# Patient Record
Sex: FEMALE | Race: BLACK OR AFRICAN AMERICAN | NOT HISPANIC OR LATINO | URBAN - METROPOLITAN AREA
[De-identification: names, ages, dates, MRNs, and addresses within clinical notes are randomized per-mention and may not be internally consistent; named-entity substitution may affect disease eponyms.]

---

## 2024-05-28 ENCOUNTER — ANCILLARY PROCEDURE (OUTPATIENT)
Dept: GENERAL RADIOLOGY | Facility: CLINIC | Age: 74
End: 2024-05-28
Attending: FAMILY MEDICINE
Payer: COMMERCIAL

## 2024-05-28 ENCOUNTER — OFFICE VISIT (OUTPATIENT)
Dept: FAMILY MEDICINE | Facility: CLINIC | Age: 74
End: 2024-05-28
Payer: COMMERCIAL

## 2024-05-28 VITALS
TEMPERATURE: 98.9 F | DIASTOLIC BLOOD PRESSURE: 82 MMHG | WEIGHT: 194.8 LBS | HEART RATE: 105 BPM | OXYGEN SATURATION: 98 % | RESPIRATION RATE: 15 BRPM | SYSTOLIC BLOOD PRESSURE: 138 MMHG

## 2024-05-28 DIAGNOSIS — G89.29 CHRONIC LEFT SHOULDER PAIN: ICD-10-CM

## 2024-05-28 DIAGNOSIS — M79.645 PAIN OF LEFT THUMB: ICD-10-CM

## 2024-05-28 DIAGNOSIS — M25.522 LEFT ELBOW PAIN: ICD-10-CM

## 2024-05-28 DIAGNOSIS — M25.512 CHRONIC LEFT SHOULDER PAIN: Primary | ICD-10-CM

## 2024-05-28 DIAGNOSIS — E11.42 TYPE 2 DIABETES MELLITUS WITH DIABETIC POLYNEUROPATHY, WITHOUT LONG-TERM CURRENT USE OF INSULIN (H): ICD-10-CM

## 2024-05-28 DIAGNOSIS — M25.512 CHRONIC LEFT SHOULDER PAIN: ICD-10-CM

## 2024-05-28 DIAGNOSIS — G89.29 CHRONIC LEFT SHOULDER PAIN: Primary | ICD-10-CM

## 2024-05-28 LAB — HBA1C MFR BLD: 7.2 % (ref 0–5.6)

## 2024-05-28 PROCEDURE — 73080 X-RAY EXAM OF ELBOW: CPT | Mod: TC | Performed by: RADIOLOGY

## 2024-05-28 PROCEDURE — 82570 ASSAY OF URINE CREATININE: CPT | Performed by: FAMILY MEDICINE

## 2024-05-28 PROCEDURE — 84460 ALANINE AMINO (ALT) (SGPT): CPT | Performed by: FAMILY MEDICINE

## 2024-05-28 PROCEDURE — 82607 VITAMIN B-12: CPT | Performed by: FAMILY MEDICINE

## 2024-05-28 PROCEDURE — 73030 X-RAY EXAM OF SHOULDER: CPT | Mod: TC | Performed by: RADIOLOGY

## 2024-05-28 PROCEDURE — 82043 UR ALBUMIN QUANTITATIVE: CPT | Performed by: FAMILY MEDICINE

## 2024-05-28 PROCEDURE — 80048 BASIC METABOLIC PNL TOTAL CA: CPT | Performed by: FAMILY MEDICINE

## 2024-05-28 PROCEDURE — 36415 COLL VENOUS BLD VENIPUNCTURE: CPT | Performed by: FAMILY MEDICINE

## 2024-05-28 PROCEDURE — 83721 ASSAY OF BLOOD LIPOPROTEIN: CPT | Mod: 59 | Performed by: FAMILY MEDICINE

## 2024-05-28 PROCEDURE — 73140 X-RAY EXAM OF FINGER(S): CPT | Mod: TC | Performed by: RADIOLOGY

## 2024-05-28 PROCEDURE — 83036 HEMOGLOBIN GLYCOSYLATED A1C: CPT | Performed by: FAMILY MEDICINE

## 2024-05-28 PROCEDURE — 99204 OFFICE O/P NEW MOD 45 MIN: CPT | Performed by: FAMILY MEDICINE

## 2024-05-28 PROCEDURE — 80061 LIPID PANEL: CPT | Performed by: FAMILY MEDICINE

## 2024-05-28 RX ORDER — GLYBURIDE-METFORMIN HYDROCHLORIDE 5; 500 MG/1; MG/1
2 TABLET ORAL 2 TIMES DAILY WITH MEALS
Qty: 120 TABLET | Refills: 4 | Status: SHIPPED | OUTPATIENT
Start: 2024-05-28

## 2024-05-28 RX ORDER — SIMVASTATIN 20 MG
20 TABLET ORAL AT BEDTIME
Qty: 30 TABLET | Refills: 11 | Status: SHIPPED | OUTPATIENT
Start: 2024-05-28 | End: 2024-05-30 | Stop reason: DRUGHIGH

## 2024-05-28 ASSESSMENT — PAIN SCALES - GENERAL: PAINLEVEL: WORST PAIN (10)

## 2024-05-28 NOTE — PROGRESS NOTES
Assessment & Plan     Maryam presented as new patient to me to evaluate her chronic left shoulder and left arm pain .  Previous records from [facility] reviewed.  Conversation done with help of .    Chronic left shoulder pain  -Maryam had a fall 9 months ago when returning from [place] to [place] and since then having chronic left shoulder pain, left elbow pain and left thumb base pain.  -No sensory deficits but daily activity is limited due to pain.  -Has not had any physical therapy.  On Tylenol as needed.    O/E:  -Restricted range of movements left shoulder/left elbow.  -Tenderness base of thumb.    PLAN:  -Possible shoulder adhesive capsulitis.  X-rays ordered.  -Recommend physical therapy/diclofenac gel for both shoulder and elbow discomfort.  -Left thumb base discomfort could be secondary to arthritis.  X-rays ordered to rule out fracture.  -Follow-up in 2 months to reassess after physical therapy.  Plan for Ortho referral if symptoms still persist and bothersome.    - XR Shoulder Left G/E 3 Views; Future  - Physical Therapy  Referral; Future  - diclofenac (VOLTAREN) 1 % topical gel; Apply 2 g topically 4 times daily as needed for moderate pain    Pain of left thumb  -Same as above.    - XR Finger Left G/E 2 Views; Future  - Occupational Therapy  Referral; Future  - diclofenac (VOLTAREN) 1 % topical gel; Apply 2 g topically 4 times daily as needed for moderate pain    Left elbow pain  -Same as above.    - Physical Therapy  Referral; Future  - XR Elbow Left G/E 3 Views; Future    Type 2 diabetes mellitus with diabetic polyneuropathy, without long-term current use of insulin (H)  -No HbA1c in records.  Patient had issues with insurance so far.  Currently under ARE and preferred labs done.  -She is done with vitamin B12 supplements.  -On Zocor 20 mg and Glucovance 5/500 (2 tablets twice daily)   -Recommended eye checkup.    - Hemoglobin A1c; Future  -  Vitamin B12; Future  - Albumin Random Urine Quantitative with Creat Ratio; Future  - Basic metabolic panel  (Ca, Cl, CO2, Creat, Gluc, K, Na, BUN); Future  - simvastatin (ZOCOR) 20 MG tablet; Take 1 tablet (20 mg) by mouth at bedtime  - glyBURIDE-metFORMIN (GLUCOVANCE) 5-500 MG tablet; Take 2 tablets by mouth 2 times daily (with meals)  - ALT; Future  - Lipid panel reflex to direct LDL Non-fasting; Future                Subjective   Maryam is a 74 year old, presenting for the following health issues:  Shoulder Pain (Left shoulder: 8 months)      5/28/2024    11:51 AM   Additional Questions   Roomed by Anali   Accompanied by self     Shoulder Pain    History of Present Illness       Reason for visit:  Left shoulder and arm pain  Symptom onset:  More than a month  Symptoms include:  Pain left side arm and shoulder  Symptom intensity:  Moderate  Symptom progression:  Worsening  Had these symptoms before:  No    She eats 2-3 servings of fruits and vegetables daily.She consumes 0 sweetened beverage(s) daily.She exercises with enough effort to increase her heart rate 9 or less minutes per day.  She exercises with enough effort to increase her heart rate 3 or less days per week.   She is taking medications regularly.     9 months ago, came from [place] to , fell down on shoulder              Review of Systems  Constitutional, HEENT, cardiovascular, pulmonary, gi and gu systems are negative, except as otherwise noted.      Objective    /82 (BP Location: Left arm, Patient Position: Sitting, Cuff Size: Adult Regular)   Pulse 105   Temp 98.9  F (37.2  C) (Oral)   Resp 15   Wt 88.4 kg (194 lb 12.8 oz)   SpO2 98%   There is no height or weight on file to calculate BMI.  Physical Exam   GENERAL: alert and no distress  NECK: no adenopathy, no asymmetry, masses, or scars  RESP: lungs clear to auscultation - no rales, rhonchi or wheezes  CV: regular rate and rhythm, normal S1 S2, no S3 or S4, no murmur, click or rub, no  peripheral edema  ABDOMEN: soft, nontender, no hepatosplenomegaly, no masses and bowel sounds normal  MS: Restricted range of movements left shoulder, left elbow pain.            Signed Electronically by: Cordell Carias MD

## 2024-05-29 LAB
ALT SERPL W P-5'-P-CCNC: 13 U/L (ref 0–50)
ANION GAP SERPL CALCULATED.3IONS-SCNC: 13 MMOL/L (ref 7–15)
BUN SERPL-MCNC: 9.3 MG/DL (ref 8–23)
CALCIUM SERPL-MCNC: 9.5 MG/DL (ref 8.8–10.2)
CHLORIDE SERPL-SCNC: 103 MMOL/L (ref 98–107)
CHOLEST SERPL-MCNC: 244 MG/DL
CREAT SERPL-MCNC: 0.57 MG/DL (ref 0.51–0.95)
CREAT UR-MCNC: 169 MG/DL
DEPRECATED HCO3 PLAS-SCNC: 23 MMOL/L (ref 22–29)
EGFRCR SERPLBLD CKD-EPI 2021: >90 ML/MIN/1.73M2
FASTING STATUS PATIENT QL REPORTED: NO
FASTING STATUS PATIENT QL REPORTED: NO
GLUCOSE SERPL-MCNC: 223 MG/DL (ref 70–99)
HDLC SERPL-MCNC: 32 MG/DL
LDLC SERPL CALC-MCNC: ABNORMAL MG/DL
LDLC SERPL DIRECT ASSAY-MCNC: 147 MG/DL
MICROALBUMIN UR-MCNC: <12 MG/L
MICROALBUMIN/CREAT UR: NORMAL MG/G{CREAT}
NONHDLC SERPL-MCNC: 212 MG/DL
POTASSIUM SERPL-SCNC: 3.9 MMOL/L (ref 3.4–5.3)
SODIUM SERPL-SCNC: 139 MMOL/L (ref 135–145)
TRIGL SERPL-MCNC: 449 MG/DL
VIT B12 SERPL-MCNC: 644 PG/ML (ref 232–1245)

## 2024-05-30 ENCOUNTER — TELEPHONE (OUTPATIENT)
Dept: FAMILY MEDICINE | Facility: CLINIC | Age: 74
End: 2024-05-30
Payer: COMMERCIAL

## 2024-05-30 ENCOUNTER — APPOINTMENT (OUTPATIENT)
Dept: INTERPRETER SERVICES | Facility: CLINIC | Age: 74
End: 2024-05-30
Payer: COMMERCIAL

## 2024-05-30 DIAGNOSIS — G89.29 CHRONIC LEFT SHOULDER PAIN: Primary | ICD-10-CM

## 2024-05-30 DIAGNOSIS — E11.42 TYPE 2 DIABETES MELLITUS WITH DIABETIC POLYNEUROPATHY, WITHOUT LONG-TERM CURRENT USE OF INSULIN (H): ICD-10-CM

## 2024-05-30 DIAGNOSIS — E78.5 HYPERLIPIDEMIA LDL GOAL <130: ICD-10-CM

## 2024-05-30 DIAGNOSIS — M25.512 CHRONIC LEFT SHOULDER PAIN: Primary | ICD-10-CM

## 2024-05-30 DIAGNOSIS — R93.89 ABNORMAL FINDING ON IMAGING: ICD-10-CM

## 2024-05-30 RX ORDER — SIMVASTATIN 40 MG
40 TABLET ORAL AT BEDTIME
Qty: 30 TABLET | Refills: 11 | Status: SHIPPED | OUTPATIENT
Start: 2024-05-30

## 2024-05-30 NOTE — TELEPHONE ENCOUNTER
This writer attempted to contact patient via  on 05/30/24      Reason for call results and left message.      If patient calls back:   Registered Nurse called.       ----- Message from Cordell Carias MD sent at 5/30/2024  1:11 PM CDT -----  Please let patient know with help of .    -Cholesterol levels are still high.  I am increasing the dose of cholesterol medication. If she is experiencing muscle aches or any side effects like chest pain/palpitations etc  with increased dose she should let me know. We need to recheck cholesterol levels in 3 months.  Orders are in.    -HbA1c slightly above goal.  Our goal should be less than 7.  Continue current medications.  Please ask her to be compliant with her medications and not miss doses.  Cut down excess carbohydrates as in rice, pasta, noodles and limit processed sugar intake.  We can recheck HbA1c in 3 months along with cholesterol labs.    Regarding x-rays:  -Thumb x-ray shows mild arthritic changes.  No fractures noted.  Start doing occupational therapy and follow-up in 2 months.    -No fractures noted in elbow x-ray.    -Shoulder x-ray: As per report, there is a possibility of old fracture or tendon changes at the attachment where patient feels discomfort.  No acute fractures noted.  I have placed orders for MRI and Ortho referral.  Patient can see how she is doing with physical therapy and after a month if she still has discomfort, she can proceed with MRI and Ortho referral.    Thank you,  -Dr.Pasu Rossana Baker, BSN, RN  [facility]

## 2024-05-31 NOTE — TELEPHONE ENCOUNTER
This writer attempted to contact patient on 05/31/24      Reason for call results and LVM via [place] .      If patient calls back:   Relay message below, (read verbatim), document that pt called and close encounter        Ashley Whitehead RN

## 2024-06-03 NOTE — TELEPHONE ENCOUNTER
This writer attempted to contact patient via  on 06/3/24 (3rd attempt)        Reason for call results and left message.        If patient calls back:              Registered Nurse called.         ----- Message from Cordell Carias MD sent at 5/30/2024  1:11 PM CDT -----  Please let patient know with help of .     -Cholesterol levels are still high.  I am increasing the dose of cholesterol medication. If she is experiencing muscle aches or any side effects like chest pain/palpitations etc  with increased dose she should let me know. We need to recheck cholesterol levels in 3 months.  Orders are in.     -HbA1c slightly above goal.  Our goal should be less than 7.  Continue current medications.  Please ask her to be compliant with her medications and not miss doses.  Cut down excess carbohydrates as in rice, pasta, noodles and limit processed sugar intake.  We can recheck HbA1c in 3 months along with cholesterol labs.     Regarding x-rays:  -Thumb x-ray shows mild arthritic changes.  No fractures noted.  Start doing occupational therapy and follow-up in 2 months.     -No fractures noted in elbow x-ray.     -Shoulder x-ray: As per report, there is a possibility of old fracture or tendon changes at the attachment where patient feels discomfort.  No acute fractures noted.  I have placed orders for MRI and Ortho referral.  Patient can see how she is doing with physical therapy and after a month if she still has discomfort, she can proceed with MRI and Ortho referral.     Thank you,  -Dr.Pasu Rossana Baker, RICHIEN, RN  [facility]

## 2024-06-04 NOTE — TELEPHONE ENCOUNTER
Unsuccessful at reaching the patient after 3 attempts by phone.  Patient is not active on Mychart.  Routing to provider to advise on next steps.      Kristina Kjellberg, MSN, RN

## 2024-07-19 ENCOUNTER — THERAPY VISIT (OUTPATIENT)
Dept: PHYSICAL THERAPY | Facility: CLINIC | Age: 74
End: 2024-07-19
Attending: FAMILY MEDICINE
Payer: COMMERCIAL

## 2024-07-19 DIAGNOSIS — M25.512 LEFT SHOULDER PAIN, UNSPECIFIED CHRONICITY: Primary | ICD-10-CM

## 2024-07-19 DIAGNOSIS — M25.522 LEFT ELBOW PAIN: ICD-10-CM

## 2024-07-19 PROCEDURE — 97161 PT EVAL LOW COMPLEX 20 MIN: CPT | Mod: GP | Performed by: PHYSICAL THERAPIST

## 2024-07-19 PROCEDURE — 97530 THERAPEUTIC ACTIVITIES: CPT | Mod: GP | Performed by: PHYSICAL THERAPIST

## 2024-07-19 ASSESSMENT — ACTIVITIES OF DAILY LIVING (ADL)
PLEASE_INDICATE_YOR_PRIMARY_REASON_FOR_REFERRAL_TO_THERAPY:: SHOULDER
THROWING_A_BALL: EXTREME DIFFICULTY
AT_ITS_WORST?: 9
OPENING_DOORS: QUITE A BIT OF DIFFICULTY
PUTTING_ON_AN_UNDERSHIRT_OR_A_PULLOVER_SWEATER: 10
REMOVING_SOMETHING_FROM_YOUR_BACK_POCKET: 10
WASHING_YOUR_HAIR?: 10
WHEN_LYING_ON_THE_INVOLVED_SIDE: 10
PUTTING_ON_YOUR_PANTS: 10
TYING_OR_LACING_SHOES: EXTREME DIFFICULTY
WASHING_YOUR_HAIR_OR_SCALP: EXTREME DIFFICULTY
USING_TOOLS_OR_APPLIANCES: EXTREME DIFFICULTY
LAUNDERING_CLOTHES: EXTREME DIFFICULTY
CARRYING_A_SMALL_SUITCASE_WITH_YOUR_AFFECTED_LIMB: EXTREME DIFFICULTY
ANY_OF_YOUR_USUAL_WORK,_HOUSEWORK_OR_SCHOOL_ACTIVITIES: QUITE A BIT OF DIFFICULTY
YOUR_USUAL_HOBBIES,_RECREATIONAL_OR_SPORTING_ACTIVITIES: QUITE A BIT OF DIFFICULTY
PLACING_AN_OBJECT_ON_A_HIGH_SHELF: 10
DRIVING: EXTREME DIFFICULTY
WASHING_YOUR_BACK: 10
PLACING_AN_OBJECT_ONTO,_OR_REMOVING_IT_FROM_AN_OVERHEAD_SHELF: QUITE A BIT OF DIFFICULTY
PUTTING_ON_A_SHIRT_THAT_BUTTONS_DOWN_THE_FRONT: 10
SLEEPING: EXTREME DIFFICULTY
CARRYING_A_HEAVY_OBJECT_OF_10_POUNDS: 10
PUSHING_UP_ON_YOUR_HANDS: EXTREME DIFFICULTY
TOUCHING_THE_BACK_OF_YOUR_NECK: 9
LIFTING_A_BAG_OF_GROCERIES_TO_WAIST_LEVEL: QUITE A BIT OF DIFFICULTY
UEFI_TOTAL_SCORE: 7
UEFI_TOTAL_SCORE/80: .09
PUSHING_WITH_THE_INVOLVED_ARM: 10
DOING_UP_BUTTONS: EXTREME DIFFICULTY
DRESS: EXTREME DIFFICULTY
PLEASE_INDICATE_YOR_PRIMARY_REASON_FOR_REFERRAL_TO_THERAPY:: ELBOW
PREPARING_FOOD: QUITE A BIT OF DIFFICULTY
REACHING_FOR_SOMETHING_ON_A_HIGH_SHELF: 10
VACUUMING,_SWEEPING,_OR_RAKING: QUITE A BIT OF DIFFICULTY
CLEANING: QUITE A BIT OF DIFFICULTY
OPENING_A_JAR: EXTREME DIFFICULTY

## 2024-07-19 NOTE — PROGRESS NOTES
PHYSICAL THERAPY EVALUATION  Type of Visit: Evaluation    See electronic medical record for Abuse and Falls Screening details.            Subjective     REFERRAL DX:   Chronic left shoulder pain   Left elbow pain       SUBJECTIVE HISTORY: Patient is a RHD 74 year old female presenting with hx of L shoulder and arm pain, here with . She had fall 11 months ago when returning from [place] to [place]. Since then has had chronic L shoulder, elbow and thumb pain. Numbness and tingling in the shoulder.    Daughter is helping her with most ADLs. Hurts to do anything with the shoulder. Got some medications from the doctor but did not seem to help. Having trouble sleeping.    PAIN:  With activity: 9/10  Frequency: with activity  Quality: sharp pain, numbness/tingling.  Progression:  no change  Exacerbated by: movement/use of arm  Eased by: medication, activity modification       Objective         Presenting condition or subjective complaint:    Date of onset: 05/30/24    Relevant medical history: Cold or hot arm or leg; Diabetes   Dates & types of surgery:      Prior diagnostic imaging/testing results: X-ray     Prior therapy history for the same diagnosis, illness or injury:        Living Environment  Social support: With family members   Type of home: House; 2-story   Stairs to enter the home:         Ramp: No   Stairs inside the home: Yes 14 Is there a railing: Yes     Help at home: Self Cares (home health aide/personal care attendant, family, etc); Home management tasks (cooking, cleaning); Medication and/or finances; Home and Yard maintenance tasks; Assist for driving and community activities; Meals on wheels/meal service; Emergency call system  Equipment owned: Straight Cane     Employment: No    Hobbies/Interests:      Patient goals for therapy:      SHOULDER OBJECTIVE  ROM:  unable to assess due to heightened pain, AROM or PROM  STRENGTH: unable to assess due to heightened pain    CERVICAL OBJECTIVE  ROM:    (Degrees) Left AROM Right AROM    Cervical Flexion 50% limited, with pain    Cervical Extension 50% limited with pain    Cervical Side bend 25% limited, pain 25% limited, pain    Cervical Rotation 50% limited, with pain 50% limited, with pain      PALPATION:   + Tenderness At Location Left   Sternocleidomastoid -   Scalenes -   Rhomboids -   Facet -   Upper Trap +   Levator +   Erector Spinae -   Suboccipitals -        + Tenderness At Location Left   Clavicle -   Sternoclavicular -   Acromioclavicular -   Biceps -   Triceps -   Supraspinatus +   Infraspinatus +   Teres minor -   Subscapularis -   Deltoid +   Levator +   Rhomboids -   Upper trap +       Assessment & Plan   CLINICAL IMPRESSIONS  Medical Diagnosis: L shoulder, elbow pain    Treatment Diagnosis: L shoulder, elbow pain   Impression/Assessment: Patient is a 74 year old female with L shoulder complaints.  The following significant findings have been identified: Pain, Decreased ROM/flexibility, Decreased strength, Impaired muscle performance, and Decreased activity tolerance. Assessment today was limited due to heightened pain levels. Discussed methods to help with pain management but any prescription of activity/exercises were not well tolerated. Discussed with daughter to follow up following MRI and possibly returning to [place] prior to continuing with therapy interventions. These impairments interfere with their ability to perform self care tasks as compared to previous level of function.     Clinical Decision Making (Complexity):  Clinical Presentation: Evolving/Changing  Clinical Presentation Rationale: based on medical and personal factors listed in PT evaluation  Clinical Decision Making (Complexity): Low complexity    PLAN OF CARE  Treatment Interventions:  Modalities: Cryotherapy, Hot Pack  Interventions: Manual Therapy, Neuromuscular Re-education, Therapeutic Activity, Therapeutic Exercise, Self-Care/Home Management    Long Term Goals     PT Goal  1  Goal Description: Patient will report 10% reduction in disability on SPADI  Rationale: to maximize safety and independence with performance of ADLs and functional tasks  Target Date: 09/13/24      Frequency of Treatment: 1x/week  Duration of Treatment: 8 weeks    Education Assessment:   Learner/Method: Patient  Education Comments: PTRx    Risks and benefits of evaluation/treatment have been explained.   Patient/Family/caregiver agrees with Plan of Care.     Evaluation Time:     PT Eval, Low Complexity Minutes (42226): 15   Present: Yes: Language: Oromo, ID Number/Identifier: [id number]     Signing Clinician: Michelle Dubois, PT        [facility]                                                                                   OUTPATIENT PHYSICAL THERAPY      PLAN OF TREATMENT FOR OUTPATIENT REHABILITATION   Patient's Last Name, First Name, Maryam West YOB: 1950   Provider's Name   [facility]   Medical Record No.  [id number]     Onset Date: 05/30/24  Start of Care Date: 07/19/24     Medical Diagnosis:  L shoulder, elbow pain      PT Treatment Diagnosis:  L shoulder, elbow pain Plan of Treatment  Frequency/Duration: 1x/week/ 8 weeks    Certification date from 07/19/24 to 09/13/24         See note for plan of treatment details and functional goals     Michelle Dubois, PT                         I CERTIFY THE NEED FOR THESE SERVICES FURNISHED UNDER        THIS PLAN OF TREATMENT AND WHILE UNDER MY CARE     (Physician attestation of this document indicates review and certification of the therapy plan).              Referring Provider:  Cordell Carias    Initial Assessment  See Epic Evaluation- Start of Care Date: 07/19/24

## 2024-07-23 ENCOUNTER — ANCILLARY PROCEDURE (OUTPATIENT)
Dept: MRI IMAGING | Facility: CLINIC | Age: 74
End: 2024-07-23
Attending: FAMILY MEDICINE
Payer: COMMERCIAL

## 2024-07-23 DIAGNOSIS — G89.29 CHRONIC LEFT SHOULDER PAIN: ICD-10-CM

## 2024-07-23 DIAGNOSIS — R93.89 ABNORMAL FINDING ON IMAGING: ICD-10-CM

## 2024-07-23 DIAGNOSIS — M25.512 CHRONIC LEFT SHOULDER PAIN: ICD-10-CM

## 2024-07-23 PROCEDURE — 73221 MRI JOINT UPR EXTREM W/O DYE: CPT | Mod: LT | Performed by: RADIOLOGY

## 2024-07-25 DIAGNOSIS — M75.52 SUBACROMIAL BURSITIS OF LEFT SHOULDER JOINT: ICD-10-CM

## 2024-07-25 DIAGNOSIS — M75.102 TEAR OF LEFT SUPRASPINATUS TENDON: Primary | ICD-10-CM

## 2024-07-25 DIAGNOSIS — R93.89 ABNORMAL FINDING ON IMAGING: ICD-10-CM

## 2024-07-29 DIAGNOSIS — M25.512 LEFT SHOULDER PAIN, UNSPECIFIED CHRONICITY: Primary | ICD-10-CM

## 2024-09-27 NOTE — TELEPHONE ENCOUNTER
REASON FOR VISIT: Tear of left supraspinatus tendon, Subacromial bursitis of left shoulder joint, Abnormal finding on imaging    DATE OF APPT: 9/30/2024   NOTES (FOR ALL VISITS) STATUS DETAILS   OFFICE NOTE from referring provider [facility] Cordell Lindsey MD 7/25/2024   OFFICE NOTE from other specialist [facility] Michelle Almodovar, PT 7/19/2024   MEDICATION LIST N/A    IMAGING  (FOR ALL VISITS)     XR Scheduled [facility]  XR Shoulder left 9/30/2024   MRI (HEAD, NECK, SPINE) [facility]  MR Shoulder left 7/23/2024   CT (HEAD, NECK, SPINE) N/A

## 2024-09-30 ENCOUNTER — PRE VISIT (OUTPATIENT)
Dept: ORTHOPEDICS | Facility: CLINIC | Age: 74
End: 2024-09-30

## 2024-10-10 ENCOUNTER — TELEPHONE (OUTPATIENT)
Dept: FAMILY MEDICINE | Facility: CLINIC | Age: 74
End: 2024-10-10
Payer: COMMERCIAL

## 2024-10-10 NOTE — TELEPHONE ENCOUNTER
Patient Quality Outreach    Patient is due for the following:   Diabetes -  A1C and Eye Exam  Colon Cancer Screening  Breast Cancer Screening - Mammogram  Physical Annual Wellness Visit      Topic Date Due    Pneumococcal Vaccine (1 of 2 - PCV) Never done    Diptheria Tetanus Pertussis (DTAP/TDAP/TD) Vaccine (1 - Tdap) Never done    Zoster (Shingles) Vaccine (1 of 2) Never done    Flu Vaccine (1) Never done    COVID-19 Vaccine (1 - 2024-25 season) Never done       Next Steps:   Schedule a Annual Wellness Visit    Type of outreach:    Sent letter.    Next Steps:  Reach out within 90 days via Letter.    Max number of attempts reached: Yes. Will try again in 90 days if patient still on fail list.    Questions for provider review:    None           Nasra Quick MA

## 2024-10-10 NOTE — LETTER
October 10, 2024    Maryam Osorio  [address] [place] [address]    Dear Maryam    At [facility] we care about your health and are committed to providing quality patient care.     Here is a list of Health Maintenance topics that are due now or due soon:  Health Maintenance Due   Topic Date Due    DEXA  Never done    DIABETIC FOOT EXAM  Never done    ANNUAL REVIEW OF HM ORDERS  Never done    ADVANCE CARE PLANNING  Never done    EYE EXAM  Never done    MAMMO SCREENING  Never done    Pneumococcal Vaccine: 65+ Years (1 of 2 - PCV) Never done    COLORECTAL CANCER SCREENING  Never done    HEPATITIS C SCREENING  Never done    DTAP/TDAP/TD IMMUNIZATION (1 - Tdap) Never done    ZOSTER IMMUNIZATION (1 of 2) Never done    RSV VACCINE (1 - Risk 60-74 years 1-dose series) Never done    MEDICARE ANNUAL WELLNESS VISIT  Never done    A1C  08/28/2024    INFLUENZA VACCINE (1) Never done    COVID-19 Vaccine (1 - 2024-25 season) Never done        We are recommending that you:  Schedule a WELLNESS (Preventative/Physical) APPOINTMENT with your primary care provider. If you go elsewhere for your wellness appointments then please disregard this reminder    ,   Schedule a MAMMOGRAM which is due.    1 in 8 women will develop invasive breast cancer during her lifetime and it is the most common non-skin cancer in [place] women.  EARLY detection, new treatments, and a better understanding of the disease have increased survival rates - the 5 year survival rate in the 1960s was 63% and today it is close to 90%.    If you are under/uninsured, we recommend you contact the [facility]. They offer mammograms at no charge or on a sliding fee charge. You can schedule with them at [phone]. Please have them send us the results.      Please disregard this reminder if you have had this exam elsewhere within the last year.  It would be helpful for us to have a copy of your mammogram report in your file so that  we can best coordinate your care - please contact us with when your test was done so we can update your record.    ,   Schedule a COLONOSCOPY to assess for colon cancer (due every 10 years or 5 years in higher risk situations.)       Colon cancer is now the second leading cause of cancer-related deaths in the [place] for both men and women and there are over 130,000 new cases and 50,000 deaths per year from colon cancer.  Colonoscopies can prevent 90-95% of these deaths.  Problem lesions can be removed before they ever become cancer.  This test is not only looking for cancer, but also getting rid of precancerious lesions.    If you are under/uninsured, we recommend you contact the [facility] program. [facility] is a free colorectal cancer screening program that provides colonoscopies for eligible under/uninsured [place] men and women. If you are interested in receiving a free colonoscopy, please call [facility] at [phone] (mention code [employer]) to see if you re eligible.     If you do not wish to do a colonoscopy or cannot afford to do one, at this time, there is another option. It is called a FIT test or Fecal Immunochemical Occult Blood Test (take home stool sample kit).  It does not replace the colonoscopy for colorectal cancer screening, but it can detect hidden bleeding in the lower colon.  It does need to be repeated every year and if a positive result is obtained, you would be referred for a colonoscopy.    If you have completed either one of these tests at another facility, please call/respond to this message with the details of when and where the tests were done and if they were normal or not. Or have the records sent to our clinic so that we can best coordinate your care.  ,    Schedule a Diabetes Follow-Up Office Appointment: You are due to be seen with your primary care provider for a diabetes follow up office appointment. Please schedule this as soon as you are able.    ,    Diabetic  Eye Exam is due:  If this was done within the last 12 months then please contact the clinic or respond to this message with the date and location so we can update your records.    , and   Schedule a Nurse-Only appointment to update your immunizations: Your records indicate that you are not up to date with your immunizations, please schedule a nurse-only appointment to get these updated or update them at your next office visit. If this is incorrect, please disregard.    To schedule an appointment or discuss this further, you may contact us by phone at the [facility] at [phone] or online through the patient portal/[employer] @ [email or web address]    Thank you for trusting [facility] and we appreciate the opportunity to serve you.  We look forward to supporting your healthcare needs in the future.    Your partners in health,      Quality Committee at [facility]

## 2024-11-11 DIAGNOSIS — E11.42 TYPE 2 DIABETES MELLITUS WITH DIABETIC POLYNEUROPATHY, WITHOUT LONG-TERM CURRENT USE OF INSULIN (H): ICD-10-CM

## 2024-11-11 RX ORDER — GLYBURIDE-METFORMIN HYDROCHLORIDE 5; 500 MG/1; MG/1
2 TABLET ORAL 2 TIMES DAILY WITH MEALS
Qty: 120 TABLET | Refills: 2 | Status: SHIPPED | OUTPATIENT
Start: 2024-11-11

## 2025-03-31 DIAGNOSIS — E11.42 TYPE 2 DIABETES MELLITUS WITH DIABETIC POLYNEUROPATHY, WITHOUT LONG-TERM CURRENT USE OF INSULIN (H): ICD-10-CM

## 2025-04-01 RX ORDER — GLYBURIDE-METFORMIN HYDROCHLORIDE 5; 500 MG/1; MG/1
2 TABLET ORAL 2 TIMES DAILY WITH MEALS
Qty: 120 TABLET | Refills: 0 | Status: SHIPPED | OUTPATIENT
Start: 2025-04-01 | End: 2025-04-02

## 2025-04-02 DIAGNOSIS — E11.42 TYPE 2 DIABETES MELLITUS WITH DIABETIC POLYNEUROPATHY, WITHOUT LONG-TERM CURRENT USE OF INSULIN (H): ICD-10-CM

## 2025-04-02 RX ORDER — GLYBURIDE-METFORMIN HYDROCHLORIDE 5; 500 MG/1; MG/1
2 TABLET ORAL 2 TIMES DAILY WITH MEALS
Qty: 360 TABLET | Refills: 0 | Status: SHIPPED | OUTPATIENT
Start: 2025-04-02

## 2025-07-07 DIAGNOSIS — E78.5 HYPERLIPIDEMIA LDL GOAL <130: ICD-10-CM

## 2025-07-07 RX ORDER — SIMVASTATIN 40 MG
40 TABLET ORAL AT BEDTIME
Qty: 30 TABLET | Refills: 1 | Status: SHIPPED | OUTPATIENT
Start: 2025-07-07

## 2025-07-07 NOTE — TELEPHONE ENCOUNTER
Called pt's daughter and relayed message--she understands and will call back and schedule an appt.

## 2025-08-17 DIAGNOSIS — E11.42 TYPE 2 DIABETES MELLITUS WITH DIABETIC POLYNEUROPATHY, WITHOUT LONG-TERM CURRENT USE OF INSULIN (H): ICD-10-CM

## 2025-08-18 RX ORDER — GLYBURIDE-METFORMIN HYDROCHLORIDE 5; 500 MG/1; MG/1
2 TABLET ORAL 2 TIMES DAILY WITH MEALS
Qty: 360 TABLET | Refills: 0 | OUTPATIENT
Start: 2025-08-18

## 2025-08-19 RX ORDER — GLYBURIDE-METFORMIN HYDROCHLORIDE 5; 500 MG/1; MG/1
2 TABLET ORAL 2 TIMES DAILY WITH MEALS
Qty: 120 TABLET | Refills: 0 | Status: SHIPPED | OUTPATIENT
Start: 2025-08-19

## 2025-08-25 ENCOUNTER — TELEPHONE (OUTPATIENT)
Dept: FAMILY MEDICINE | Facility: CLINIC | Age: 75
End: 2025-08-25
Payer: COMMERCIAL

## 2025-08-28 ENCOUNTER — TELEPHONE (OUTPATIENT)
Dept: FAMILY MEDICINE | Facility: CLINIC | Age: 75
End: 2025-08-28
Payer: COMMERCIAL

## 2025-09-03 DIAGNOSIS — E78.5 HYPERLIPIDEMIA LDL GOAL <130: ICD-10-CM

## 2025-09-04 RX ORDER — SIMVASTATIN 40 MG
40 TABLET ORAL AT BEDTIME
Qty: 90 TABLET | Refills: 0 | Status: SHIPPED | OUTPATIENT
Start: 2025-09-04